# Patient Record
Sex: MALE | Race: WHITE | NOT HISPANIC OR LATINO | URBAN - METROPOLITAN AREA
[De-identification: names, ages, dates, MRNs, and addresses within clinical notes are randomized per-mention and may not be internally consistent; named-entity substitution may affect disease eponyms.]

---

## 2025-05-31 ENCOUNTER — OFFICE VISIT (OUTPATIENT)
Dept: URGENT CARE | Age: 35
End: 2025-05-31
Payer: MEDICAID

## 2025-05-31 VITALS
DIASTOLIC BLOOD PRESSURE: 102 MMHG | SYSTOLIC BLOOD PRESSURE: 162 MMHG | HEART RATE: 59 BPM | OXYGEN SATURATION: 99 % | RESPIRATION RATE: 17 BRPM | TEMPERATURE: 99 F | WEIGHT: 170 LBS

## 2025-05-31 DIAGNOSIS — K04.7 TOOTH INFECTION: Primary | ICD-10-CM

## 2025-05-31 PROCEDURE — 99070 SPECIAL SUPPLIES PHYS/QHP: CPT | Performed by: FAMILY MEDICINE

## 2025-05-31 PROCEDURE — 99203 OFFICE O/P NEW LOW 30 MIN: CPT | Performed by: FAMILY MEDICINE

## 2025-05-31 RX ORDER — NAPROXEN 500 MG/1
500 TABLET ORAL
Qty: 20 TABLET | Refills: 0 | Status: SHIPPED | OUTPATIENT
Start: 2025-05-31 | End: 2025-06-10

## 2025-05-31 RX ORDER — AMOXICILLIN 875 MG/1
875 TABLET, COATED ORAL 2 TIMES DAILY
Qty: 20 TABLET | Refills: 0 | Status: SHIPPED | OUTPATIENT
Start: 2025-05-31 | End: 2025-06-10

## 2025-05-31 NOTE — PATIENT INSTRUCTIONS
Your blood pressure reading was elevated here today in the office.  Continue to check this at home after you have rested for a few minutes.  If it continues to remain elevated, follow-up sooner.    You will be started on antibiotic which will be sent to the pharmacy; please complete the regimen as directed even if your symptoms improve. It is recommended to take an Probiotic while on this medication to reduce symptoms of upset stomach, diarrhea    Do not use over-the-counter anti-inflammatory such as ibuprofen, Advil, Aleve while on this  prescription medication. Tylenol is acceptable to use.    Follow-up with your dentist as soon as possible.    Hydrate well with plenty of fluid

## 2025-05-31 NOTE — PROGRESS NOTES
Subjective   Patient ID: Jacky San is a 35 y.o. male. They present today with a chief complaint of Other (Mouth pain poss infection. Right sided pain.  On and off. Worse last 3 days. ).    Patient disposition: Home    History of Present Illness  HPI  3 days of pain in the mouth on the right side.  Symptoms come and go.  Has had cracked tooth for many months.  No specific injury recently.  However, patient tends to grind his molars a lot causing degradation.  Currently on Suboxone.  No fever or chills but complains of hot flashes on and off.  No GI symptoms.  No drainage from the gum.  Chewing on that side is causing more sensitivity and pain.  No complaints or symptoms.      Past Medical History  Allergies as of 05/31/2025    (No Known Allergies)       Prescriptions Prior to Admission[1]     Current Medications[2]    Problem List[3]    Surgical History[4]         Review of Systems  As noted in HPI. ROS otherwise negative unless noted.       Objective    Vitals:    05/31/25 1820   BP: (!) 162/102   Pulse: 59   Resp: 17   Temp: 37.2 °C (99 °F)   SpO2: 99%   Weight: 77.1 kg (170 lb)     No LMP for male patient.    Physical Exam  Constitutional: vital signs reviewed. Well developed, well nourished. patient alert and patient without distress.   Psych: Normal mood and affect  Head and Face: Normal and atraumatic.    Cardiovascular: Heart rate normal, normal S1 and S2, no gallops, no murmurs and no pericardial rub. Rhythm: Normal.  Pulmonary: No respiratory distress. Palpation of chest: Normal. Clear bilateral breath sounds.   Skin: Normal skin color and pigmentation, normal skin turgor, and no rash.  ENT: No cervical lymphadenopathy.  Tooth 1 completely unroofed, inflamed gum, tender.  No discharge.  Poor dentition surrounding teeth.        Procedures    Point of Care Test & Imaging Results from this visit           Diagnostic study results (if any) were reviewed.  (If applicable) preliminary radiology reading:  None    Assessment/Plan   Allergies, medications, history, and pertinent labs/EKGs/Imaging reviewed.        Medical Decision Making  See note    Orders and Diagnoses  There are no diagnoses linked to this encounter.    Medical Admin Record      Follow Up Instructions  No follow-ups on file.    At time of discharge patient was clinically well-appearing and HDS for outpatient management. The patient and/or family was educated regarding diagnosis, supportive care, OTC and Rx medications. The patient and/or family was given the opportunity to ask questions prior to discharge and all questions answered. They verbalized understanding of my discussion of the plans for treatment, expected course, indications to return to  or seek further evaluation in ED, and the need for timely follow up as directed.      Electronically signed by [facility]           [1] (Not in a hospital admission)  [2]   No current outpatient medications on file.     No current facility-administered medications for this visit.   [3] There is no problem list on file for this patient.  [4] No past surgical history on file.